# Patient Record
Sex: MALE | Race: WHITE | NOT HISPANIC OR LATINO | Employment: OTHER | URBAN - NONMETROPOLITAN AREA
[De-identification: names, ages, dates, MRNs, and addresses within clinical notes are randomized per-mention and may not be internally consistent; named-entity substitution may affect disease eponyms.]

---

## 2017-08-02 ENCOUNTER — OFFICE VISIT (OUTPATIENT)
Dept: CARDIOLOGY | Facility: CLINIC | Age: 72
End: 2017-08-02
Payer: MEDICARE

## 2017-08-02 VITALS
SYSTOLIC BLOOD PRESSURE: 110 MMHG | DIASTOLIC BLOOD PRESSURE: 70 MMHG | BODY MASS INDEX: 29.23 KG/M2 | HEART RATE: 72 BPM | OXYGEN SATURATION: 91 % | HEIGHT: 63 IN | WEIGHT: 165 LBS

## 2017-08-02 DIAGNOSIS — R73.9 HYPERGLYCEMIA: ICD-10-CM

## 2017-08-02 DIAGNOSIS — E78.5 OTHER AND UNSPECIFIED HYPERLIPIDEMIA: ICD-10-CM

## 2017-08-02 DIAGNOSIS — I10 ESSENTIAL HYPERTENSION, BENIGN: ICD-10-CM

## 2017-08-02 DIAGNOSIS — I35.0 NONRHEUMATIC AORTIC VALVE STENOSIS: ICD-10-CM

## 2017-08-02 PROCEDURE — 99203 OFFICE O/P NEW LOW 30 MIN: CPT | Performed by: INTERNAL MEDICINE

## 2017-08-02 PROCEDURE — 93000 ELECTROCARDIOGRAM COMPLETE: CPT | Performed by: INTERNAL MEDICINE

## 2017-08-02 RX ORDER — LISINOPRIL 2.5 MG/1
2.5 TABLET ORAL 2 TIMES DAILY
COMMUNITY

## 2017-08-02 RX ORDER — ATORVASTATIN CALCIUM 40 MG/1
40 TABLET, FILM COATED ORAL NIGHTLY
COMMUNITY

## 2017-08-02 ASSESSMENT — ENCOUNTER SYMPTOMS
VOMITING: 0
WEAKNESS: 0
SHORTNESS OF BREATH: 0
CONSTITUTIONAL NEGATIVE: 1
NEUROLOGICAL NEGATIVE: 1
BACK PAIN: 1
PSYCHIATRIC NEGATIVE: 1
NAUSEA: 0
PALPITATIONS: 0

## 2017-08-02 NOTE — Clinical Note
[facility] [address]  Phone: [phone]  Fax: [phone]              Nakul Dixon  1945    Encounter Date: 8/2/2017    Marty Sebastian M.D.          PROGRESS NOTE:  Subjective:   Nakul Dixon is a 71 y.o. male who is seen in consultation today at the request of MAYURI Cabral for evaluation of a heart murmur. The patient states he has had a heart murmur for many years, but no one in the past this patient much attention to it until now. He has no history of rheumatic fever. No history of exercise intolerance, chest pain or known coronary disease.  His cardiac risk factor profile is positive for hypertension and hyperlipidemia. He also has hyperglycemia. The patient is a nonsmoker.  He has a history of back problems with limited range of motion but remains as active as he can.    No past medical history on file.  No past surgical history on file.  No family history on file.  History   Smoking status   • Not on file   Smokeless tobacco   • Not on file     Allergies not on file  No outpatient encounter prescriptions on file as of 8/2/2017.     No facility-administered encounter medications on file as of 8/2/2017.     Review of Systems   Constitutional: Negative.  Negative for malaise/fatigue.   Respiratory: Negative for shortness of breath.    Cardiovascular: Negative for chest pain, palpitations and leg swelling.   Gastrointestinal: Negative for nausea and vomiting.   Musculoskeletal: Positive for back pain.   Neurological: Negative.  Negative for weakness.   Psychiatric/Behavioral: Negative.         Objective:   There were no vitals taken for this visit.    Physical Exam   Constitutional: He is oriented to person, place, and time. He appears well-developed and well-nourished. No distress.   HENT:   Head: Atraumatic.   Eyes: Conjunctivae and EOM are normal. Pupils are equal, round, and reactive to light.   Neck:  Neck supple. No JVD present.   Cardiovascular: Normal rate and regular rhythm.    Murmur heard.   Crescendo decrescendo systolic murmur is present with a grade of 2/6   No delay in carotid upstroke   Pulmonary/Chest: Effort normal and breath sounds normal. No respiratory distress. He has no wheezes. He has no rales.   Abdominal: Soft. There is no tenderness.   Musculoskeletal: He exhibits no edema.   Reduced range of motion of back   Neurological: He is alert and oriented to person, place, and time.   Skin: Skin is warm and dry. He is not diaphoretic.       Assessment:     1. Nonrheumatic aortic valve stenosis  [facility] EKG (Clinic Performed)    Echocardiogram Comp w/o Cont   2. Other and unspecified hyperlipidemia  [facility] EKG (Clinic Performed)   3. Essential hypertension, benign  [facility] EKG (Clinic Performed)   4. Hyperglycemia  [facility] EKG (Clinic Performed)       Medical Decision Making:  Today's Assessment / Status / Plan:     EKG: Sinus rhythm, early R-wave transition, early repolarization pattern.    Impression:  Aortic stenosis. The patient is a classic murmur of aortic valve disease. There is no delayed carotid upstroke. The murmur does not appear to represent severe stenosis by exam. An echo will be obtained to confirm this. The patient has significant risk factors for coronary disease including hypertension and hyperlipidemia. He decline my amputation for treadmill today. EKG is unremarkable except for early repolarization. Return in one year.    Cc: MAYURI Cabral Nv      No Recipients

## 2017-08-02 NOTE — MR AVS SNAPSHOT
Nakul Dixon   2017 3:20 PM   Office Visit   MRN: [id number]    Department:  Heart [facility]   Dept Phone:  [phone]    Description:  Male : 1945   Provider:  Marty Sebastian M.D.           Allergies as of 2017     Not on File      You were diagnosed with     Nonrheumatic aortic valve stenosis   [130958]       Other and unspecified hyperlipidemia   [272.4.ICD-9-CM]       Essential hypertension, benign   [401.1.ICD-9-CM]       Hyperglycemia   [525018]         Basic Information     Date Of Birth Sex Race Ethnicity Preferred Language    1945 Male Other Non- English      Problem List              ICD-10-CM Priority Class Noted - Resolved    Aortic stenosis I35.0   2017 - Present    Other and unspecified hyperlipidemia E78.5   2017 - Present    Essential hypertension, benign I10   2017 - Present    Hyperglycemia R73.9   2017 - Present      Health Maintenance        Date Due Completion Dates    IMM DTaP/Tdap/Td Vaccine (1 - Tdap) 1964 ---    COLONOSCOPY 1995 ---    IMM ZOSTER VACCINE 2005 ---    IMM PNEUMOCOCCAL 65+ (ADULT) LOW/MEDIUM RISK SERIES (1 of 2 - PCV13) 2010 ---    IMM INFLUENZA (1) 2017 ---            Current Immunizations     No immunizations on file.      Below and/or attached are the medications your provider expects you to take. Review all of your home medications and newly ordered medications with your provider and/or pharmacist. Follow medication instructions as directed by your provider and/or pharmacist. Please keep your medication list with you and share with your provider. Update the information when medications are discontinued, doses are changed, or new medications (including over-the-counter products) are added; and carry medication information at all times in the event of emergency situations     Allergies:  (Not on file)          Medications  Valid as of: 2017 -  4:18 PM    Generic Name Brand Name Tablet  Size Instructions for use    .                 Medicines prescribed today were sent to:     None      Medication refill instructions:       If your prescription bottle indicates you have medication refills left, it is not necessary to call your provider’s office. Please contact your pharmacy and they will refill your medication.    If your prescription bottle indicates you do not have any refills left, you may request refills at any time through one of the following ways: The online [email or web address] system (except Urgent Care), by calling your provider’s office, or by asking your pharmacy to contact your provider’s office with a refill request. Medication refills are processed only during regular business hours and may not be available until the next business day. Your provider may request additional information or to have a follow-up visit with you prior to refilling your medication.   *Please Note: Medication refills are assigned a new Rx number when refilled electronically. Your pharmacy may indicate that no refills were authorized even though a new prescription for the same medication is available at the pharmacy. Please request the medicine by name with the pharmacy before contacting your provider for a refill.        Your To Do List     Future Labs/Procedures Complete By Expires    Echocardiogram Comp w/o Cont  As directed 8/2/2018         [email or web address] Access Code: [email or web address]  Expires: 9/1/2017  4:18 PM    Your email address is not on file at [facility].  Email Addresses are required for you to sign up for [email or web address]), please contact [phone] to verify your personal information and to provide your email address prior to attempting to register for [email or web address]).    Nakul Dixon  [address]   [place] [id number]    [employer])  A secure, online tool to manage your health information     [facility]’s [employer])® is a secure, online tool that connects you to your personalized health information from the privacy of your home --  day or night - making it very easy for you to manage your healthcare. Once the activation process is completed, you can even access your medical information using the [employer] arabella, which is available for free in the Apple Arabella store or Google Play store.     To learn more about [employer], visit [email or web address]    There are two levels of access available (as shown below):   My Chart Features  [facility]  Urgent  Care Non-[facility]   Email your healthcare team securely and privately 24/7 X X X    Manage appointments: schedule your next appointment; view details of past/upcoming appointments X      Request prescription refills. X      View recent personal medical records, including lab and immunizations X X X X   View health record, including health history, allergies, medications X X X X   Read reports about your outpatient visits, procedures, consult and ER notes X X X X   See your discharge summary, which is a recap of your hospital and/or ER visit that includes your diagnosis, lab results, and care plan X X  X     How to register for [employer]:  Once your e-mail address has been verified, follow the following steps to sign up for [employer].     1. Go to  [email or web address]  2. Click on the Sign Up Now box, which takes you to the New Member Sign Up page. You will need to provide the following information:  a. Enter your Ch[employer]r Access Code exactly as it appears at the top of this page. (You will not need to use this code after you’ve completed the sign-up process. If you do not sign up before the expiration date, you must request a new code.)   b. Enter your date of birth.   c. Enter your home email address.   d. Click Submit, and follow the next screen’s instructions.  3. Create a [employer] ID. This will be your Ch[employer]r login ID and cannot be changed, so think of one that is secure and easy to remember.  4. Create a [employer] password. You can change  your password at any time.  5. Enter your Password Reset Question and Answer. This can be used at a later time if you forget your password.   6. Enter your e-mail address. This allows you to receive e-mail notifications when new information is available in [employer].  7. Click Sign Up. You can now view your health information.    For assistance activating your [employer] account, call [phone]

## 2017-08-02 NOTE — PROGRESS NOTES
Subjective:   Nakul Dixon is a 71 y.o. male who is seen in consultation today at the request of MAYURI Cabral for evaluation of a heart murmur. The patient states he has had a heart murmur for many years, but no one in the past this patient much attention to it until now. He has no history of rheumatic fever. No history of exercise intolerance, chest pain or known coronary disease.  His cardiac risk factor profile is positive for hypertension and hyperlipidemia. He also has hyperglycemia. The patient is a nonsmoker.  He has a history of back problems with limited range of motion but remains as active as he can.    No past medical history on file.  No past surgical history on file.  No family history on file.  History   Smoking status   • Not on file   Smokeless tobacco   • Not on file     Allergies not on file  No outpatient encounter prescriptions on file as of 8/2/2017.     No facility-administered encounter medications on file as of 8/2/2017.     Review of Systems   Constitutional: Negative.  Negative for malaise/fatigue.   Respiratory: Negative for shortness of breath.    Cardiovascular: Negative for chest pain, palpitations and leg swelling.   Gastrointestinal: Negative for nausea and vomiting.   Musculoskeletal: Positive for back pain.   Neurological: Negative.  Negative for weakness.   Psychiatric/Behavioral: Negative.         Objective:   There were no vitals taken for this visit.    Physical Exam   Constitutional: He is oriented to person, place, and time. He appears well-developed and well-nourished. No distress.   HENT:   Head: Atraumatic.   Eyes: Conjunctivae and EOM are normal. Pupils are equal, round, and reactive to light.   Neck: Neck supple. No JVD present.   Cardiovascular: Normal rate and regular rhythm.    Murmur heard.   Crescendo decrescendo systolic murmur is present with a grade of 2/6   No delay in carotid upstroke   Pulmonary/Chest: Effort normal and breath sounds normal. No  respiratory distress. He has no wheezes. He has no rales.   Abdominal: Soft. There is no tenderness.   Musculoskeletal: He exhibits no edema.   Reduced range of motion of back   Neurological: He is alert and oriented to person, place, and time.   Skin: Skin is warm and dry. He is not diaphoretic.       Assessment:     1. Nonrheumatic aortic valve stenosis  [facility] EKG (Clinic Performed)    Echocardiogram Comp w/o Cont   2. Other and unspecified hyperlipidemia  [facility] EKG (Clinic Performed)   3. Essential hypertension, benign  [facility] EKG (Clinic Performed)   4. Hyperglycemia  [facility] EKG (Clinic Performed)       Medical Decision Making:  Today's Assessment / Status / Plan:     EKG: Sinus rhythm, early R-wave transition, early repolarization pattern.    Impression:  Aortic stenosis. The patient is a classic murmur of aortic valve disease. There is no delayed carotid upstroke. The murmur does not appear to represent severe stenosis by exam. An echo will be obtained to confirm this. The patient has significant risk factors for coronary disease including hypertension and hyperlipidemia. He decline my amputation for treadmill today. EKG is unremarkable except for early repolarization. Return in one year.    Cc: MAYURI Cabral Nv

## 2017-08-03 ENCOUNTER — TELEPHONE (OUTPATIENT)
Dept: CARDIOLOGY | Facility: MEDICAL CENTER | Age: 72
End: 2017-08-03

## 2017-08-03 NOTE — TELEPHONE ENCOUNTER
[facility] doesn't do echos.,   [facility]  alerted to call pt. To schedule in [place].

## 2017-08-03 NOTE — TELEPHONE ENCOUNTER
----- Message from Virginia Wilcox sent at 8/3/2017  4:18 PM PDT -----  Regarding: orders sent to [facility]  Damien Maxwell at [facility] called about order for cardiac stress test. He said they do not do cardiac stress tests there. Patient needs to be scheduled at another facility for the test. He can be reached at [phone], ext. [phone].

## 2017-09-06 ENCOUNTER — NON-PROVIDER VISIT (OUTPATIENT)
Dept: CARDIOLOGY | Facility: PHYSICIAN GROUP | Age: 72
End: 2017-09-06
Payer: MEDICARE

## 2017-09-06 DIAGNOSIS — I35.0 NONRHEUMATIC AORTIC VALVE STENOSIS: ICD-10-CM

## 2017-09-07 LAB
LV EJECT FRACT  99904: 65
LV EJECT FRACT MOD 2C 99903: 54.47
LV EJECT FRACT MOD 4C 99902: 58.13
LV EJECT FRACT MOD BP 99901: 57.24

## 2017-09-15 ENCOUNTER — TELEPHONE (OUTPATIENT)
Dept: CARDIOLOGY | Facility: MEDICAL CENTER | Age: 72
End: 2017-09-15

## 2017-09-29 NOTE — TELEPHONE ENCOUNTER
Called pt. To advise.      Message   Received: Today   Message Contents   Marty Sebasitan M.D.  Megan Luna, L.PCATHY   Caller: Unspecified (2 weeks ago)             Echo shows only very mild narrowing. Nothing needs to be done, just observation.   Repeat echo in 2 yers

## 2017-12-15 ENCOUNTER — HOSPITAL ENCOUNTER (INPATIENT)
Facility: MEDICAL CENTER | Age: 72
LOS: 1 days | DRG: 054 | End: 2017-12-16
Attending: EMERGENCY MEDICINE | Admitting: HOSPITALIST
Payer: MEDICARE

## 2017-12-15 ENCOUNTER — RESOLUTE PROFESSIONAL BILLING HOSPITAL PROF FEE (OUTPATIENT)
Dept: HOSPITALIST | Facility: MEDICAL CENTER | Age: 72
End: 2017-12-15
Payer: MEDICARE

## 2017-12-15 ENCOUNTER — HOSPITAL ENCOUNTER (OUTPATIENT)
Dept: RADIOLOGY | Facility: MEDICAL CENTER | Age: 72
End: 2017-12-15

## 2017-12-15 DIAGNOSIS — R90.0 INTRACRANIAL MASS: ICD-10-CM

## 2017-12-15 PROBLEM — I61.9 HEMORRHAGIC STROKE (HCC): Status: ACTIVE | Noted: 2017-12-15

## 2017-12-15 PROBLEM — C71.9 CANCER OF BRAIN (HCC): Status: ACTIVE | Noted: 2017-12-15

## 2017-12-15 PROBLEM — C22.9 CANCER OF LIVER (HCC): Status: ACTIVE | Noted: 2017-12-15

## 2017-12-15 PROBLEM — E78.5 DYSLIPIDEMIA: Status: ACTIVE | Noted: 2017-12-15

## 2017-12-15 PROBLEM — C34.90 CANCER OF LUNG (HCC): Status: ACTIVE | Noted: 2017-12-15

## 2017-12-15 LAB
ALBUMIN SERPL BCP-MCNC: 4.1 G/DL (ref 3.2–4.9)
ALBUMIN/GLOB SERPL: 1.4 G/DL
ALP SERPL-CCNC: 76 U/L (ref 30–99)
ALT SERPL-CCNC: 20 U/L (ref 2–50)
ANION GAP SERPL CALC-SCNC: 8 MMOL/L (ref 0–11.9)
APPEARANCE UR: CLEAR
APTT PPP: 27.1 SEC (ref 24.7–36)
AST SERPL-CCNC: 22 U/L (ref 12–45)
BACTERIA #/AREA URNS HPF: NEGATIVE /HPF
BASOPHILS # BLD AUTO: 0.7 % (ref 0–1.8)
BASOPHILS # BLD: 0.04 K/UL (ref 0–0.12)
BILIRUB SERPL-MCNC: 0.4 MG/DL (ref 0.1–1.5)
BILIRUB UR QL STRIP.AUTO: NEGATIVE
BUN SERPL-MCNC: 18 MG/DL (ref 8–22)
CALCIUM SERPL-MCNC: 9.6 MG/DL (ref 8.5–10.5)
CHLORIDE SERPL-SCNC: 104 MMOL/L (ref 96–112)
CO2 SERPL-SCNC: 25 MMOL/L (ref 20–33)
COLOR UR: YELLOW
CREAT SERPL-MCNC: 0.84 MG/DL (ref 0.5–1.4)
EOSINOPHIL # BLD AUTO: 0.18 K/UL (ref 0–0.51)
EOSINOPHIL NFR BLD: 3.1 % (ref 0–6.9)
EPI CELLS #/AREA URNS HPF: ABNORMAL /HPF
ERYTHROCYTE [DISTWIDTH] IN BLOOD BY AUTOMATED COUNT: 41.1 FL (ref 35.9–50)
GFR SERPL CREATININE-BSD FRML MDRD: >60 ML/MIN/1.73 M 2
GLOBULIN SER CALC-MCNC: 3 G/DL (ref 1.9–3.5)
GLUCOSE SERPL-MCNC: 114 MG/DL (ref 65–99)
GLUCOSE UR STRIP.AUTO-MCNC: NEGATIVE MG/DL
HCT VFR BLD AUTO: 43.4 % (ref 42–52)
HGB BLD-MCNC: 14.6 G/DL (ref 14–18)
HYALINE CASTS #/AREA URNS LPF: ABNORMAL /LPF
IMM GRANULOCYTES # BLD AUTO: 0.02 K/UL (ref 0–0.11)
IMM GRANULOCYTES NFR BLD AUTO: 0.3 % (ref 0–0.9)
INR PPP: 0.96 (ref 0.87–1.13)
KETONES UR STRIP.AUTO-MCNC: NEGATIVE MG/DL
LEUKOCYTE ESTERASE UR QL STRIP.AUTO: NEGATIVE
LYMPHOCYTES # BLD AUTO: 1.56 K/UL (ref 1–4.8)
LYMPHOCYTES NFR BLD: 26.7 % (ref 22–41)
MCH RBC QN AUTO: 29.3 PG (ref 27–33)
MCHC RBC AUTO-ENTMCNC: 33.6 G/DL (ref 33.7–35.3)
MCV RBC AUTO: 87.1 FL (ref 81.4–97.8)
MICRO URNS: ABNORMAL
MONOCYTES # BLD AUTO: 0.49 K/UL (ref 0–0.85)
MONOCYTES NFR BLD AUTO: 8.4 % (ref 0–13.4)
NEUTROPHILS # BLD AUTO: 3.56 K/UL (ref 1.82–7.42)
NEUTROPHILS NFR BLD: 60.8 % (ref 44–72)
NITRITE UR QL STRIP.AUTO: NEGATIVE
NRBC # BLD AUTO: 0 K/UL
NRBC BLD AUTO-RTO: 0 /100 WBC
PH UR STRIP.AUTO: 5 [PH]
PLATELET # BLD AUTO: 227 K/UL (ref 164–446)
PMV BLD AUTO: 9.4 FL (ref 9–12.9)
POTASSIUM SERPL-SCNC: 4.1 MMOL/L (ref 3.6–5.5)
PROT SERPL-MCNC: 7.1 G/DL (ref 6–8.2)
PROT UR QL STRIP: 30 MG/DL
PROTHROMBIN TIME: 12.5 SEC (ref 12–14.6)
RBC # BLD AUTO: 4.98 M/UL (ref 4.7–6.1)
RBC # URNS HPF: ABNORMAL /HPF
RBC UR QL AUTO: NEGATIVE
SODIUM SERPL-SCNC: 137 MMOL/L (ref 135–145)
SP GR UR STRIP.AUTO: 1.03
TROPONIN I SERPL-MCNC: <0.01 NG/ML (ref 0–0.04)
UROBILINOGEN UR STRIP.AUTO-MCNC: 0.2 MG/DL
WBC # BLD AUTO: 5.9 K/UL (ref 4.8–10.8)
WBC #/AREA URNS HPF: ABNORMAL /HPF

## 2017-12-15 PROCEDURE — 85730 THROMBOPLASTIN TIME PARTIAL: CPT

## 2017-12-15 PROCEDURE — 81001 URINALYSIS AUTO W/SCOPE: CPT

## 2017-12-15 PROCEDURE — 93010 ELECTROCARDIOGRAM REPORT: CPT | Performed by: INTERNAL MEDICINE

## 2017-12-15 PROCEDURE — 85610 PROTHROMBIN TIME: CPT

## 2017-12-15 PROCEDURE — 85025 COMPLETE CBC W/AUTO DIFF WBC: CPT

## 2017-12-15 PROCEDURE — 80053 COMPREHEN METABOLIC PANEL: CPT

## 2017-12-15 PROCEDURE — 700111 HCHG RX REV CODE 636 W/ 250 OVERRIDE (IP): Performed by: EMERGENCY MEDICINE

## 2017-12-15 PROCEDURE — 99285 EMERGENCY DEPT VISIT HI MDM: CPT

## 2017-12-15 PROCEDURE — 96374 THER/PROPH/DIAG INJ IV PUSH: CPT

## 2017-12-15 PROCEDURE — 93005 ELECTROCARDIOGRAM TRACING: CPT | Performed by: HOSPITALIST

## 2017-12-15 PROCEDURE — 770006 HCHG ROOM/CARE - MED/SURG/GYN SEMI*

## 2017-12-15 PROCEDURE — 36415 COLL VENOUS BLD VENIPUNCTURE: CPT

## 2017-12-15 PROCEDURE — 99223 1ST HOSP IP/OBS HIGH 75: CPT | Performed by: HOSPITALIST

## 2017-12-15 PROCEDURE — 84484 ASSAY OF TROPONIN QUANT: CPT

## 2017-12-15 RX ORDER — LORAZEPAM 1 MG/1
1 TABLET ORAL EVERY 4 HOURS PRN
Status: DISCONTINUED | OUTPATIENT
Start: 2017-12-15 | End: 2017-12-16 | Stop reason: HOSPADM

## 2017-12-15 RX ORDER — LABETALOL HYDROCHLORIDE 5 MG/ML
10 INJECTION, SOLUTION INTRAVENOUS
Status: DISCONTINUED | OUTPATIENT
Start: 2017-12-15 | End: 2017-12-16 | Stop reason: HOSPADM

## 2017-12-15 RX ORDER — HYDRALAZINE HYDROCHLORIDE 20 MG/ML
20 INJECTION INTRAMUSCULAR; INTRAVENOUS EVERY 4 HOURS PRN
Status: DISCONTINUED | OUTPATIENT
Start: 2017-12-15 | End: 2017-12-16 | Stop reason: HOSPADM

## 2017-12-15 RX ORDER — AMOXICILLIN 250 MG
2 CAPSULE ORAL 2 TIMES DAILY
Status: DISCONTINUED | OUTPATIENT
Start: 2017-12-16 | End: 2017-12-16 | Stop reason: HOSPADM

## 2017-12-15 RX ORDER — LORAZEPAM 1 MG/1
4 TABLET ORAL
Status: DISCONTINUED | OUTPATIENT
Start: 2017-12-15 | End: 2017-12-16 | Stop reason: HOSPADM

## 2017-12-15 RX ORDER — BISACODYL 10 MG
10 SUPPOSITORY, RECTAL RECTAL
Status: DISCONTINUED | OUTPATIENT
Start: 2017-12-15 | End: 2017-12-16 | Stop reason: HOSPADM

## 2017-12-15 RX ORDER — LORAZEPAM 1 MG/1
3 TABLET ORAL
Status: DISCONTINUED | OUTPATIENT
Start: 2017-12-15 | End: 2017-12-16 | Stop reason: HOSPADM

## 2017-12-15 RX ORDER — DEXAMETHASONE SODIUM PHOSPHATE 4 MG/ML
4 INJECTION, SOLUTION INTRA-ARTICULAR; INTRALESIONAL; INTRAMUSCULAR; INTRAVENOUS; SOFT TISSUE EVERY 6 HOURS
Status: DISCONTINUED | OUTPATIENT
Start: 2017-12-16 | End: 2017-12-16 | Stop reason: HOSPADM

## 2017-12-15 RX ORDER — SODIUM CHLORIDE 9 MG/ML
INJECTION, SOLUTION INTRAVENOUS
Status: DISCONTINUED | OUTPATIENT
Start: 2017-12-15 | End: 2017-12-16 | Stop reason: HOSPADM

## 2017-12-15 RX ORDER — POLYETHYLENE GLYCOL 3350 17 G/17G
1 POWDER, FOR SOLUTION ORAL
Status: DISCONTINUED | OUTPATIENT
Start: 2017-12-15 | End: 2017-12-16 | Stop reason: HOSPADM

## 2017-12-15 RX ORDER — LORAZEPAM 2 MG/ML
0.5 INJECTION INTRAMUSCULAR EVERY 4 HOURS PRN
Status: DISCONTINUED | OUTPATIENT
Start: 2017-12-15 | End: 2017-12-16 | Stop reason: HOSPADM

## 2017-12-15 RX ORDER — LORAZEPAM 1 MG/1
0.5 TABLET ORAL EVERY 4 HOURS PRN
Status: DISCONTINUED | OUTPATIENT
Start: 2017-12-15 | End: 2017-12-16 | Stop reason: HOSPADM

## 2017-12-15 RX ORDER — LORAZEPAM 2 MG/ML
2 INJECTION INTRAMUSCULAR
Status: DISCONTINUED | OUTPATIENT
Start: 2017-12-15 | End: 2017-12-16 | Stop reason: HOSPADM

## 2017-12-15 RX ORDER — LORAZEPAM 2 MG/ML
1 INJECTION INTRAMUSCULAR
Status: DISCONTINUED | OUTPATIENT
Start: 2017-12-15 | End: 2017-12-16 | Stop reason: HOSPADM

## 2017-12-15 RX ORDER — LORAZEPAM 1 MG/1
2 TABLET ORAL
Status: DISCONTINUED | OUTPATIENT
Start: 2017-12-15 | End: 2017-12-16 | Stop reason: HOSPADM

## 2017-12-15 RX ORDER — LORAZEPAM 2 MG/ML
1.5 INJECTION INTRAMUSCULAR
Status: DISCONTINUED | OUTPATIENT
Start: 2017-12-15 | End: 2017-12-16 | Stop reason: HOSPADM

## 2017-12-15 RX ORDER — DEXAMETHASONE SODIUM PHOSPHATE 4 MG/ML
4 INJECTION, SOLUTION INTRA-ARTICULAR; INTRALESIONAL; INTRAMUSCULAR; INTRAVENOUS; SOFT TISSUE ONCE
Status: COMPLETED | OUTPATIENT
Start: 2017-12-15 | End: 2017-12-15

## 2017-12-15 RX ADMIN — DEXAMETHASONE SODIUM PHOSPHATE 4 MG: 4 INJECTION, SOLUTION INTRAMUSCULAR; INTRAVENOUS at 21:20

## 2017-12-15 ASSESSMENT — ENCOUNTER SYMPTOMS
MYALGIAS: 0
WHEEZING: 0
SHORTNESS OF BREATH: 0
VOMITING: 0
WEAKNESS: 1
PHOTOPHOBIA: 0
LOSS OF CONSCIOUSNESS: 0
DIARRHEA: 0
FOCAL WEAKNESS: 1
FEVER: 0
NECK PAIN: 0
SENSORY CHANGE: 1
BRUISES/BLEEDS EASILY: 0
TREMORS: 0
COUGH: 0
CHILLS: 0
SPEECH CHANGE: 1
PALPITATIONS: 0
NAUSEA: 0
BACK PAIN: 0
TINGLING: 0
BLURRED VISION: 0
FLANK PAIN: 0
HEARTBURN: 0
DOUBLE VISION: 0
SEIZURES: 0
HEADACHES: 0
ABDOMINAL PAIN: 0
DEPRESSION: 0
CONSTIPATION: 0

## 2017-12-15 ASSESSMENT — PATIENT HEALTH QUESTIONNAIRE - PHQ9
1. LITTLE INTEREST OR PLEASURE IN DOING THINGS: NOT AT ALL
SUM OF ALL RESPONSES TO PHQ QUESTIONS 1-9: 0
SUM OF ALL RESPONSES TO PHQ9 QUESTIONS 1 AND 2: 0
2. FEELING DOWN, DEPRESSED, IRRITABLE, OR HOPELESS: NOT AT ALL

## 2017-12-15 ASSESSMENT — COGNITIVE AND FUNCTIONAL STATUS - GENERAL
DRESSING REGULAR UPPER BODY CLOTHING: A LITTLE
DAILY ACTIVITIY SCORE: 18
MOVING FROM LYING ON BACK TO SITTING ON SIDE OF FLAT BED: A LITTLE
EATING MEALS: A LITTLE
MOVING TO AND FROM BED TO CHAIR: A LITTLE
SUGGESTED CMS G CODE MODIFIER MOBILITY: CK
PERSONAL GROOMING: A LITTLE
WALKING IN HOSPITAL ROOM: A LITTLE
SUGGESTED CMS G CODE MODIFIER DAILY ACTIVITY: CK
DRESSING REGULAR LOWER BODY CLOTHING: A LITTLE
STANDING UP FROM CHAIR USING ARMS: A LITTLE
CLIMB 3 TO 5 STEPS WITH RAILING: A LITTLE
TOILETING: A LITTLE
MOBILITY SCORE: 18
TURNING FROM BACK TO SIDE WHILE IN FLAT BAD: A LITTLE
HELP NEEDED FOR BATHING: A LITTLE

## 2017-12-15 ASSESSMENT — PAIN SCALES - GENERAL
PAINLEVEL_OUTOF10: 0

## 2017-12-15 ASSESSMENT — LIFESTYLE VARIABLES
HOW MANY TIMES IN THE PAST YEAR HAVE YOU HAD 5 OR MORE DRINKS IN A DAY: 0
TOTAL SCORE: 0
EVER_SMOKED: YES
HAVE PEOPLE ANNOYED YOU BY CRITICIZING YOUR DRINKING: NO
HAVE YOU EVER FELT YOU SHOULD CUT DOWN ON YOUR DRINKING: NO
ON A TYPICAL DAY WHEN YOU DRINK ALCOHOL HOW MANY DRINKS DO YOU HAVE: 2
CONSUMPTION TOTAL: NEGATIVE
SUBSTANCE_ABUSE: 0
EVER FELT BAD OR GUILTY ABOUT YOUR DRINKING: NO
DO YOU DRINK ALCOHOL: YES
TOTAL SCORE: 0
AVERAGE NUMBER OF DAYS PER WEEK YOU HAVE A DRINK CONTAINING ALCOHOL: 6
EVER HAD A DRINK FIRST THING IN THE MORNING TO STEADY YOUR NERVES TO GET RID OF A HANGOVER: NO
TOTAL SCORE: 0
ALCOHOL_USE: YES

## 2017-12-16 ENCOUNTER — PATIENT OUTREACH (OUTPATIENT)
Dept: HEALTH INFORMATION MANAGEMENT | Facility: OTHER | Age: 72
End: 2017-12-16

## 2017-12-16 ENCOUNTER — HOSPITAL ENCOUNTER (OUTPATIENT)
Dept: RADIOLOGY | Facility: MEDICAL CENTER | Age: 72
End: 2017-12-16

## 2017-12-16 VITALS
DIASTOLIC BLOOD PRESSURE: 77 MMHG | OXYGEN SATURATION: 94 % | RESPIRATION RATE: 18 BRPM | SYSTOLIC BLOOD PRESSURE: 145 MMHG | BODY MASS INDEX: 28.79 KG/M2 | WEIGHT: 162.48 LBS | TEMPERATURE: 97.6 F | HEART RATE: 94 BPM | HEIGHT: 63 IN

## 2017-12-16 LAB
ALBUMIN SERPL BCP-MCNC: 3.7 G/DL (ref 3.2–4.9)
ALBUMIN/GLOB SERPL: 1.3 G/DL
ALP SERPL-CCNC: 68 U/L (ref 30–99)
ALT SERPL-CCNC: 18 U/L (ref 2–50)
ANION GAP SERPL CALC-SCNC: 6 MMOL/L (ref 0–11.9)
AST SERPL-CCNC: 19 U/L (ref 12–45)
BASOPHILS # BLD AUTO: 0.2 % (ref 0–1.8)
BASOPHILS # BLD: 0.01 K/UL (ref 0–0.12)
BILIRUB SERPL-MCNC: 0.4 MG/DL (ref 0.1–1.5)
BUN SERPL-MCNC: 18 MG/DL (ref 8–22)
CALCIUM SERPL-MCNC: 9.4 MG/DL (ref 8.5–10.5)
CHLORIDE SERPL-SCNC: 104 MMOL/L (ref 96–112)
CO2 SERPL-SCNC: 26 MMOL/L (ref 20–33)
CREAT SERPL-MCNC: 0.78 MG/DL (ref 0.5–1.4)
EKG IMPRESSION: NORMAL
EKG IMPRESSION: NORMAL
EOSINOPHIL # BLD AUTO: 0 K/UL (ref 0–0.51)
EOSINOPHIL NFR BLD: 0 % (ref 0–6.9)
ERYTHROCYTE [DISTWIDTH] IN BLOOD BY AUTOMATED COUNT: 41.3 FL (ref 35.9–50)
GFR SERPL CREATININE-BSD FRML MDRD: >60 ML/MIN/1.73 M 2
GLOBULIN SER CALC-MCNC: 2.9 G/DL (ref 1.9–3.5)
GLUCOSE BLD-MCNC: 192 MG/DL (ref 65–99)
GLUCOSE BLD-MCNC: 266 MG/DL (ref 65–99)
GLUCOSE SERPL-MCNC: 216 MG/DL (ref 65–99)
HCT VFR BLD AUTO: 41.5 % (ref 42–52)
HGB BLD-MCNC: 14.1 G/DL (ref 14–18)
IMM GRANULOCYTES # BLD AUTO: 0.02 K/UL (ref 0–0.11)
IMM GRANULOCYTES NFR BLD AUTO: 0.3 % (ref 0–0.9)
LYMPHOCYTES # BLD AUTO: 0.46 K/UL (ref 1–4.8)
LYMPHOCYTES NFR BLD: 7.6 % (ref 22–41)
MCH RBC QN AUTO: 29.8 PG (ref 27–33)
MCHC RBC AUTO-ENTMCNC: 34 G/DL (ref 33.7–35.3)
MCV RBC AUTO: 87.7 FL (ref 81.4–97.8)
MONOCYTES # BLD AUTO: 0.07 K/UL (ref 0–0.85)
MONOCYTES NFR BLD AUTO: 1.2 % (ref 0–13.4)
NEUTROPHILS # BLD AUTO: 5.46 K/UL (ref 1.82–7.42)
NEUTROPHILS NFR BLD: 90.7 % (ref 44–72)
NRBC # BLD AUTO: 0 K/UL
NRBC BLD AUTO-RTO: 0 /100 WBC
PLATELET # BLD AUTO: 205 K/UL (ref 164–446)
PMV BLD AUTO: 9.7 FL (ref 9–12.9)
POTASSIUM SERPL-SCNC: 4.3 MMOL/L (ref 3.6–5.5)
PROT SERPL-MCNC: 6.6 G/DL (ref 6–8.2)
RBC # BLD AUTO: 4.73 M/UL (ref 4.7–6.1)
SODIUM SERPL-SCNC: 136 MMOL/L (ref 135–145)
WBC # BLD AUTO: 6 K/UL (ref 4.8–10.8)

## 2017-12-16 PROCEDURE — 93010 ELECTROCARDIOGRAM REPORT: CPT | Performed by: INTERNAL MEDICINE

## 2017-12-16 PROCEDURE — 36415 COLL VENOUS BLD VENIPUNCTURE: CPT

## 2017-12-16 PROCEDURE — 3E0234Z INTRODUCTION OF SERUM, TOXOID AND VACCINE INTO MUSCLE, PERCUTANEOUS APPROACH: ICD-10-PCS | Performed by: HOSPITALIST

## 2017-12-16 PROCEDURE — 80053 COMPREHEN METABOLIC PANEL: CPT

## 2017-12-16 PROCEDURE — 99239 HOSP IP/OBS DSCHRG MGMT >30: CPT | Performed by: HOSPITALIST

## 2017-12-16 PROCEDURE — 85025 COMPLETE CBC W/AUTO DIFF WBC: CPT

## 2017-12-16 PROCEDURE — 90471 IMMUNIZATION ADMIN: CPT

## 2017-12-16 PROCEDURE — 700111 HCHG RX REV CODE 636 W/ 250 OVERRIDE (IP): Performed by: HOSPITALIST

## 2017-12-16 PROCEDURE — 82962 GLUCOSE BLOOD TEST: CPT

## 2017-12-16 PROCEDURE — 90662 IIV NO PRSV INCREASED AG IM: CPT | Performed by: HOSPITALIST

## 2017-12-16 PROCEDURE — 93005 ELECTROCARDIOGRAM TRACING: CPT | Performed by: HOSPITALIST

## 2017-12-16 PROCEDURE — 90670 PCV13 VACCINE IM: CPT | Performed by: HOSPITALIST

## 2017-12-16 RX ORDER — DEXAMETHASONE 4 MG/1
4 TABLET ORAL 3 TIMES DAILY
Qty: 120 TAB | Refills: 0 | Status: SHIPPED | OUTPATIENT
Start: 2017-12-16

## 2017-12-16 RX ADMIN — DEXAMETHASONE SODIUM PHOSPHATE 4 MG: 4 INJECTION, SOLUTION INTRAMUSCULAR; INTRAVENOUS at 12:16

## 2017-12-16 RX ADMIN — PNEUMOCOCCAL 13-VALENT CONJUGATE VACCINE 0.5 ML: 2.2; 2.2; 2.2; 2.2; 2.2; 4.4; 2.2; 2.2; 2.2; 2.2; 2.2; 2.2; 2.2 INJECTION, SUSPENSION INTRAMUSCULAR at 12:27

## 2017-12-16 RX ADMIN — INFLUENZA A VIRUSA/MICHIGAN/45/2015 X-275 (H1N1) ANTIGEN (FORMALDEHYDE INACTIVATED), INFLUENZA A VIRUS A/HONG KONG/4801/2014 X-263B (H3N2) ANTIGEN (FORMALDEHYDE INACTIVATED), AND INFLUENZA B VIRUS B/BRISBANE/60/2008 ANTIGEN (FORMALDEHYDE INACTIVATED) 0.5 ML: 60; 60; 60 INJECTION, SUSPENSION INTRAMUSCULAR at 12:28

## 2017-12-16 RX ADMIN — DEXAMETHASONE SODIUM PHOSPHATE 4 MG: 4 INJECTION, SOLUTION INTRAMUSCULAR; INTRAVENOUS at 00:47

## 2017-12-16 RX ADMIN — DEXAMETHASONE SODIUM PHOSPHATE 4 MG: 4 INJECTION, SOLUTION INTRAMUSCULAR; INTRAVENOUS at 05:23

## 2017-12-16 ASSESSMENT — PAIN SCALES - GENERAL
PAINLEVEL_OUTOF10: 0

## 2017-12-16 NOTE — ED NOTES
".  Chief Complaint   Patient presents with   • Unilateral Weakness     To left upper and lower extremities since Sunday, hx of brain tumors with mets     ./69   Pulse 64   Temp 36.9 °C (98.4 °F)   Resp 16   Ht 1.6 m (5' 3\")   Wt 74.8 kg (165 lb)   SpO2 97%   BMI 29.23 kg/m²     Sent by PCP with above complaints, MRI done yesterday showing \"multiple hemorrhagic masses\", educated on triage process, placed in lobby with significant other, told to inform staff of any changes in condition.    "

## 2017-12-16 NOTE — ASSESSMENT & PLAN NOTE
Left-sided weakness with concern for hemorrhagic stoke on MRI  We'll need blood sugar control  Blood pressure control IV PRN  Neurosurgery consult. We'll see the patient in the morning  PT OT speech eval is pending

## 2017-12-16 NOTE — PROGRESS NOTES
Pt A&Ox4, denies N/T, N/V. Noted weakness on left side, LUE flaccid. Pt is able to walk with hand held assist. Completed Med rec. Partially completed admit - vaccine administration needed. Passed bedside swallow eval. Tele in place. Family at bedside. Provided stroke education handbook, reviewed POC. Call light and personal belongings within reach

## 2017-12-16 NOTE — PROGRESS NOTES
Monitor summary: New @2231 SB-SR 53-78, OK 0.18, QRS 0.10, QT 0.36, per strip from monitor room.

## 2017-12-16 NOTE — CARE PLAN
Problem: Safety  Goal: Will remain free from injury    Intervention: Provide assistance with mobility  Assessed pt mobility, pt is able to ambulate with one person hand held assist      Problem: Knowledge Deficit  Goal: Knowledge of disease process/condition, treatment plan, diagnostic tests, and medications will improve    Intervention: Assess knowledge level of disease process/condition, treatment plan, diagnostic tests, and medications  Provided pt and family stroke education, provided stroke book, reviewed POC      Problem: Safety:  Goal: Will remain free from injury    Intervention: Provide assistance with mobility  Assessed pt mobility, pt is able to ambulate with one person hand held assist      Problem: Knowledge Deficit:  Goal: Knowledge of disease process/condition, treatment plan, diagnostic tests, and medications will improve    Intervention: Assess knowledge level of disease process/condition, treatment plan, diagnostic tests, and medications  Provided pt and family stroke education, provided stroke book, reviewed POC

## 2017-12-16 NOTE — THERAPY
Clinical swallow evaluation orders received and addressed. RN reported that the pt is discharging home at this time, and a CSE is not warranted.

## 2017-12-16 NOTE — ED NOTES
Rounded on pt. Pt ambulatory in the hallway with assist of handrail. Updated on wait times. VSS. Family continues to wait with pt in [facility].

## 2017-12-16 NOTE — ED PROVIDER NOTES
ED Provider Note    CHIEF COMPLAINT  Chief Complaint   Patient presents with   • Unilateral Weakness     To left upper and lower extremities since Sunday, hx of brain tumors with mets       HPI  Nakul Dixon is a 72 y.o. male who presentsTo the emergency department with difficulty with utilization of his left upper extremity. The patient states the symptoms started on Sunday when he did not feel right. He noticed that he is off balance and then had progressive weakness of his left upper and lower extremity. On Tuesday the patient went to the clinic and had an outpatient CT scan performed the following day that showed lesions that were concerning from a metastatic standpoint on the CT scan of his brain. The patient had a scheduled MRI with and without contrast today and this showed a cerebellar and cerebral suspected metastatic lesion with edema as well as mild acute hemorrhage. The patient was instructed to come to [facility] for further workup. The patient was unaware of any prior cancer. He does have some dyslipidemia and hypertension but otherwise has been healthy. He does not have any headaches. He does not have any visual changes.    REVIEW OF SYSTEMS  See HPI for further details. All other systems are negative.     PAST MEDICAL HISTORY  Past Medical History:   Diagnosis Date   • Cancer (CMS-HCC)     brain, lung, liver   • Hypercholesterolemia    • Hypertension        SOCIAL HISTORY  Social History     Social History   • Marital status:      Spouse name: N/A   • Number of children: N/A   • Years of education: N/A     Social History Main Topics   • Smoking status: Former Smoker     Packs/day: 1.00     Types: Cigarettes     Quit date: 1/1/1990   • Smokeless tobacco: Never Used   • Alcohol use Yes      Comment: 2 glasses wine/night   • Drug use: No   • Sexual activity: Not on file     Other Topics Concern   • Not on file     Social History Narrative   • No narrative on file           PHYSICAL EXAM  VITAL  "SIGNS: /81   Pulse (!) 58   Temp 36.9 °C (98.4 °F)   Resp 16   Ht 1.6 m (5' 3\")   Wt 74.8 kg (165 lb)   SpO2 97%   BMI 29.23 kg/m²   Constitutional: in acute distress, Non-toxic appearance.   HENT: Normocephalic, Atraumatic, tympanic membranes are intact and nonerythematous bilaterally, Oropharynx moist without exudates or erythema, Nose normal.   Eyes: PERRLA, EOMI, Conjunctiva normal.  Neck: Supple without meningismus  Lymphatic: No lymphadenopathy noted.   Cardiovascular: Normal heart rate, Normal rhythm, No murmurs, No rubs, No gallops.   Thorax & Lungs: Normal breath sounds, No respiratory distress, No wheezing, No chest tenderness.   Abdomen: Bowel sounds normal, Soft, No tenderness, no rebound, no guarding, no distention, No masses, No pulsatile masses.   Skin: Warm, Dry, No erythema, No rash.   Back: No tenderness, No CVA tenderness.   Extremities: Atraumatic with symmetric distal pulses, No edema, No tenderness, No cyanosis, No clubbing.   Neurologic: Alert & oriented x 3, cranial nerves II through XII are intact, 3 out of 5 strength left upper and lower extremity, sensation is intact, the patient does have profound ataxia   Psychiatric: Affect normal, Judgment normal, Mood normal.     COURSE & MEDICAL DECISION MAKING  Pertinent Labs & Imaging studies reviewed. (See chart for details)  This is 72-year-old gentleman who presents emergency department left-sided weakness. I did review his recent MRI that showed multiple hemorrhagic masses involving the right cerebellar and cerebral hemisphere as well as a superior left frontal lobe. I also reviewed a CT scan from 12 December that showed similar lesions. The patient also had a CT scan of the cervical spine that showed a suspicious lesion in the right apex consistent with a neoplasm. Therefore suspect the patient does have metastatic disease which is very concerning. Furthermore he has loss of function of his left upper extremity and lower extremity " which is not complete. Therefore neurosurgery has been consulted for emergent consultation and the patient admitted to the hospitalist. Baseline laboratory studies have been ordered and did not show any significant abnormalities. The [facility] has accepted the patient will be admitted in guarded condition.     I spoke with the neurosurgeon he does recommend Decadron 4 milligrams every 6 hours and this information will be relayed to the admitting team. At the time of admission the patient's exam remains unchanged and he is hemodynamically stable.    FINAL IMPRESSION  1. Left-sided weakness secondary to suspected metastatic intracranial disease   2. Critical care time 30 minutes     Disposition  The patient will be admitted in guarded condition    Electronically signed by: Andreas Meng, 12/15/2017 9:09 PM

## 2017-12-16 NOTE — CONSULTS
Procedure Requested: liver or lung biopsy  Date of request: 12/16/17  Date of [id number]/16/17  Requesting Provider: MD Lucius    Summary:  RUL nodule, RIGHT hepatic mass on outside CT         Interventional Radiology Recommendation:  Obtain outside imaging report and review  Schedule RIGHT lung biopsy as outpatient  D/W Dr. Kan

## 2017-12-16 NOTE — CARE PLAN
Problem: Discharge Barriers/Planning  Goal: Patient's continuum of care needs will be met    Intervention: Assess potential discharge barriers on admission and throughout hospital stay  Pt would like to see oncologist and have Biopsy done outpatient at [facility]. Correspondence with Dr. Kan to facilitate.

## 2017-12-16 NOTE — DISCHARGE INSTRUCTIONS
Discharge Instructions    Discharged to home by car with relative. Discharged via walking, hospital escort: Refused.  Special equipment needed: Not Applicable    Be sure to schedule a follow-up appointment with your primary care doctor or any specialists as instructed.     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 Unless I have been given specific instructions below for another diet, I accept this instruction as my diet prescription.   Other diet: Regular diet    Special Instructions: None    · Is patient discharged on Warfarin / Coumadin?   No     · Is patient Post Blood Transfusion?  No    Depression / Suicide Risk    As you are discharged from this [facility] facility, it is important to learn how to keep safe from harming yourself.    Recognize the warning signs:  · Abrupt changes in personality, positive or negative- including increase in energy   · Giving away possessions  · Change in eating patterns- significant weight changes-  positive or negative  · Change in sleeping patterns- unable to sleep or sleeping all the time   · Unwillingness or inability to communicate  · Depression  · Unusual sadness, discouragement and loneliness  · Talk of wanting to die  · Neglect of personal appearance   · Rebelliousness- reckless behavior  · Withdrawal from people/activities they love  · Confusion- inability to concentrate     If you or a loved one observes any of these behaviors or has concerns about self-harm, here's what you can do:  · Talk about it- your feelings and reasons for harming yourself  · Remove any means that you might use to hurt  yourself (examples: pills, rope, extension cords, firearm)  · Get professional help from the community (Mental Health, Substance Abuse, psychological counseling)  · Do not be alone:Call your Safe Contact- someone whom you trust who will be there for you.  · Call your local CRISIS HOTLINE 706-6763 or 544-666-9867  · Call your local Children's Mobile Crisis Response Team [place] (964) 699-8929 or www.[employer]  · Call the toll free National Suicide Prevention Hotlines   · National Suicide Prevention Lifeline 991-466-IWVE (4468)  · [employer] Hope Line Network 800-SUICIDE (392-7999)    Dexamethasone tablets  What is this medicine?  DEXAMETHASONE (dex a METH a sone) is a corticosteroid. It is commonly used to treat inflammation of the skin, joints, lungs, and other organs. Common conditions treated include asthma, allergies, and arthritis. It is also used for other conditions, such as blood disorders and diseases of the adrenal glands.  This medicine may be used for other purposes; ask your health care provider or pharmacist if you have questions.  COMMON BRAND NAME(S): Decadron, DexPak Jr TaperPak, DexPak TaperPak, Zema-Jose  What should I tell my health care provider before I take this medicine?  They need to know if you have any of these conditions:  -Cushing's syndrome  -diabetes  -glaucoma  -heart problems or disease  -high blood pressure  -infection like herpes, measles, tuberculosis, or chickenpox  -kidney disease  -liver disease  -mental problems  -myasthenia gravis  -osteoporosis  -previous heart attack  -seizures  -stomach, ulcer or intestine disease including colitis and diverticulitis  -thyroid problem  -an unusual or allergic reaction to dexamethasone, corticosteroids, other medicines, lactose, foods, dyes, or preservatives  -pregnant or trying to get pregnant  -breast-feeding  How should I use this medicine?  Take this medicine by mouth with a drink of water. Follow the directions on the  prescription label. Take it with food or milk to avoid stomach upset. If you are taking this medicine once a day, take it in the morning. Do not take more medicine than you are told to take. Do not suddenly stop taking your medicine because you may develop a severe reaction. Your doctor will tell you how much medicine to take. If your doctor wants you to stop the medicine, the dose may be slowly lowered over time to avoid any side effects.  Talk to your pediatrician regarding the use of this medicine in children. Special care may be needed.  Patients over 65 years old may have a stronger reaction and need a smaller dose.  Overdosage: If you think you have taken too much of this medicine contact a poison control center or emergency room at once.  NOTE: This medicine is only for you. Do not share this medicine with others.  What if I miss a dose?  If you miss a dose, take it as soon as you can. If it is almost time for your next dose, talk to your doctor or health care professional. You may need to miss a dose or take an extra dose. Do not take double or extra doses without advice.  What may interact with this medicine?  Do not take this medicine with any of the following medications:  -mifepristone, RU-486  -vaccines  This medicine may also interact with the following medications:  -amphotericin B  -antibiotics like clarithromycin, erythromycin, and troleandomycin  -aspirin and aspirin-like drugs  -barbiturates like phenobarbital  -carbamazepine  -cholestyramine  -cholinesterase inhibitors like donepezil, galantamine, rivastigmine, and tacrine  -cyclosporine  -digoxin  -diuretics  -ephedrine  -female hormones, like estrogens or progestins and birth control pills  -indinavir  -isoniazid  -ketoconazole  -medicines for diabetes  -medicines that improve muscle tone or strength for conditions like myasthenia gravis  -NSAIDs, medicines for pain and inflammation, like ibuprofen or  naproxen  -phenytoin  -rifampin  -thalidomide  -warfarin  This list may not describe all possible interactions. Give your health care provider a list of all the medicines, herbs, non-prescription drugs, or dietary supplements you use. Also tell them if you smoke, drink alcohol, or use illegal drugs. Some items may interact with your medicine.  What should I watch for while using this medicine?  Visit your doctor or health care professional for regular checks on your progress. If you are taking this medicine over a prolonged period, carry an identification card with your name and address, the type and dose of your medicine, and your doctor's name and address.  This medicine may increase your risk of getting an infection. Stay away from people who are sick. Tell your doctor or health care professional if you are around anyone with measles or chickenpox.  If you are going to have surgery, tell your doctor or health care professional that you have taken this medicine within the last twelve months.  Ask your doctor or health care professional about your diet. You may need to lower the amount of salt you eat.  The medicine can increase your blood sugar. If you are a diabetic check with your doctor if you need help adjusting the dose of your diabetic medicine.  What side effects may I notice from receiving this medicine?  Side effects that you should report to your doctor or health care professional as soon as possible:  -allergic reactions like skin rash, itching or hives, swelling of the face, lips, or tongue  -changes in vision  -fever, sore throat, sneezing, cough, or other signs of infection, wounds that will not heal  -increased thirst  -mental depression, mood swings, mistaken feelings of self importance or of being mistreated  -pain in hips, back, ribs, arms, shoulders, or legs  -redness, blistering, peeling or loosening of the skin, including inside the mouth  -trouble passing urine or change in the amount of  urine  -swelling of feet or lower legs  -unusual bleeding or bruising  Side effects that usually do not require medical attention (report to your doctor or health care professional if they continue or are bothersome):  -headache  -nausea, vomiting  -skin problems, acne, thin and shiny skin  -weight gain  This list may not describe all possible side effects. Call your doctor for medical advice about side effects. You may report side effects to FDA at 9-075-FDA-2654.  Where should I keep my medicine?  Keep out of the reach of children.  Store at room temperature between 20 and 25 degrees C (68 and 77 degrees F). Protect from light. Throw away any unused medicine after the expiration date.  NOTE: This sheet is a summary. It may not cover all possible information. If you have questions about this medicine, talk to your doctor, pharmacist, or health care provider.  © 2014, [employer]. (4/9/2009 2:02:13 PM)

## 2017-12-16 NOTE — CARE PLAN
Problem: Safety  Goal: Will remain free from injury    Intervention: Collaborate with Interdisciplinary Team for safe transfer and mobilization techniques  Pt is steady on his own, aware of limitations, ambulates bishop without assist.      Problem: Safety:  Goal: Will remain free from injury    Intervention: Collaborate with Interdisciplinary Team for safe transfer and mobilization techniques  Pt is steady on his own, aware of limitations, ambulates bishop without assist.

## 2017-12-16 NOTE — ED NOTES
ERP evaluated patient prior to arrival to room. PIV established; pt connected to monitor; admitting MD at the bedside. VSS at this time.

## 2017-12-16 NOTE — PROGRESS NOTES
Dc instructions reviewed with pt and spouse. All questions addressed and answered. Script for decadron given to pt. PIV removed. Pt dressing self.

## 2017-12-16 NOTE — H&P
Hospital Medicine History and Physical    Date of Service  12/15/2017    Chief Complaint  Chief Complaint   Patient presents with   • Unilateral Weakness     To left upper and lower extremities since Sunday, hx of brain tumors with mets       History of Presenting Illness  72 y.o. male who presented 12/15/2017 with Difficulty of utilizing his left upper extremity and leg. A seven-day nose he was off balance and progressive weakness of his left upper and lower extremity. Weakness getting worse especially in the left upper extremity. Nothing makes it better nothing makes it worse. Tuesday patient went to the clinic to [facility] and CT scan performed showed lesions concerning for metastatic cancer of the brain. He also had CT chest CT abdomen which showed lesion in the liver and right upper lobe as well. However we do not have these records. He had MRI today showed a cerebellar and cerebral suspected metastatic lesion with edema as well as mild acute hemorrhage. Patient was instructed to come better now for further workup. No visual changes no headaches no speech changes.   Primary Care Physician  SONYA Cormier    Consultants  NSG    Code Status  Full    Review of Systems  Review of Systems   Constitutional: Positive for malaise/fatigue. Negative for chills and fever.   HENT: Negative for ear discharge, ear pain and hearing loss.    Eyes: Negative for blurred vision, double vision and photophobia.   Respiratory: Negative for cough, shortness of breath and wheezing.    Cardiovascular: Negative for chest pain, palpitations and leg swelling.   Gastrointestinal: Negative for abdominal pain, constipation, diarrhea, heartburn, nausea and vomiting.   Genitourinary: Negative for dysuria and flank pain.   Musculoskeletal: Negative for back pain, myalgias and neck pain.   Skin: Negative for rash.   Neurological: Positive for sensory change, speech change, focal weakness and weakness. Negative for tingling, tremors,  "seizures, loss of consciousness and headaches.   Endo/Heme/Allergies: Does not bruise/bleed easily.   Psychiatric/Behavioral: Negative for depression and substance abuse.        Past Medical History  Past Medical History:   Diagnosis Date   • Cancer (CMS-HCC)     brain, lung, liver   • Hypercholesterolemia    • Hypertension        Surgical History  Past Surgical History:   Procedure Laterality Date   • OTHER ORTHOPEDIC SURGERY      \"back surgery\"       Medications  No current facility-administered medications on file prior to encounter.      Current Outpatient Prescriptions on File Prior to Encounter   Medication Sig Dispense Refill   • lisinopril (PRINIVIL) 2.5 MG Tab Take 2.5 mg by mouth 2 times a day.     • atorvastatin (LIPITOR) 40 MG Tab Take 40 mg by mouth every evening.         Family History  History reviewed. No pertinent family history.    Social History  Social History   Substance Use Topics   • Smoking status: Former Smoker     Packs/day: 1.00     Types: Cigarettes     Quit date: 1990   • Smokeless tobacco: Never Used   • Alcohol use Yes      Comment: 2 glasses wine/night       Allergies  No Known Allergies     Physical Exam  Laboratory   Hemodynamics  Temp (24hrs), Av.9 °C (98.4 °F), Min:36.9 °C (98.4 °F), Max:36.9 °C (98.4 °F)   Temperature: 36.9 °C (98.4 °F)  Pulse  Av  Min: 58  Max: 64    Blood Pressure : 155/81      Respiratory      Respiration: 16, Pulse Oximetry: 97 %             Physical Exam   Constitutional: He is oriented to person, place, and time. He appears well-developed and well-nourished. No distress.   HENT:   Head: Normocephalic and atraumatic.   Eyes: Conjunctivae and EOM are normal. Pupils are equal, round, and reactive to light.   Neck: Normal range of motion. Neck supple. No JVD present.   Cardiovascular: Normal rate, regular rhythm, normal heart sounds and intact distal pulses.    No murmur heard.  Pulmonary/Chest: Effort normal and breath sounds normal. No " respiratory distress. He has no wheezes.   Abdominal: Soft. Bowel sounds are normal. He exhibits no distension. There is no tenderness.   Musculoskeletal: Normal range of motion. He exhibits no edema.   Neurological: He is alert and oriented to person, place, and time.   Absent left sided shoulder shrug, weakness in strength left arm and leg. Otherwise cranial nerves intact   Skin: Skin is warm and dry. No erythema.   Psychiatric: He has a normal mood and affect. His behavior is normal. Judgment and thought content normal.       Recent Labs      12/15/17   2018   WBC  5.9   RBC  4.98   HEMOGLOBIN  14.6   HEMATOCRIT  43.4   MCV  87.1   MCH  29.3   MCHC  33.6*   RDW  41.1   PLATELETCT  227   MPV  9.4     Recent Labs      12/15/17   2018   SODIUM  137   POTASSIUM  4.1   CHLORIDE  104   CO2  25   GLUCOSE  114*   BUN  18   CREATININE  0.84   CALCIUM  9.6     Recent Labs      12/15/17   2018   ALTSGPT  20   ASTSGOT  22   ALKPHOSPHAT  76   TBILIRUBIN  0.4   GLUCOSE  114*     Recent Labs      12/15/17   2018   APTT  27.1   INR  0.96             Lab Results   Component Value Date    TROPONINI <0.01 12/15/2017     Urinalysis:  No results found for: SPECGRAVITY, GLUCOSEUR, KETONES, NITRITE, WBCURINE, RBCURINE, BACTERIA, EPITHELCELL     Imaging  Please see scanned images    Assessment/Plan     I anticipate this patient will require at least two midnights for appropriate medical management, necessitating inpatient admission.    * Cancer of brain (CMS-HCC)   Assessment & Plan    Noted on MRI  Started on Decadron  Neurosurgery consult.  Will need heme/onc consultation in the morning        Cancer of liver (CMS-HCC)   Assessment & Plan    Lesion noted on liver outside hospital records attempt to obtain records        Cancer of lung (CMS-HCC)   Assessment & Plan    Per report from patient right upper lobe lesion noted at outside hospital  We'll attempt to obtain records        Dyslipidemia   Assessment & Plan    Continue statin when  able to tolerate        Hemorrhagic stroke (CMS-[facility])   Assessment & Plan    Left-sided weakness with concern for hemorrhagic stoke on MRI  We'll need blood sugar control  Blood pressure control IV PRN  Neurosurgery consult. We'll see the patient in the morning  PT OT speech eval is pending        Essential hypertension, benign- (present on admission)   Assessment & Plan    IV when necessary for now and resume oral medications when able to tolerate            VTE prophylaxis: SCD

## 2017-12-16 NOTE — ASSESSMENT & PLAN NOTE
Noted on MRI  Started on Decadron  Neurosurgery consult.  Will need heme/onc consultation in the morning

## 2017-12-16 NOTE — ED NOTES
Blood drawn and sent to lab. Pt declined wheelchair, ambulatory to triage room with slow steady gait and assist of hand rail. Updated on wait times

## 2017-12-16 NOTE — ASSESSMENT & PLAN NOTE
Per report from patient right upper lobe lesion noted at outside hospital  We'll attempt to obtain records

## 2017-12-16 NOTE — DISCHARGE SUMMARY
Hospital Medicine Discharge Note     Admit Date:  12/15/2017       Discharge Date:   12/16/2017    Attending Physician:  Tom Kan     Diagnoses (includes active and resolved):   Principal Problem:    Cancer of brain (CMS-HCC) POA: Unknown  Active Problems:    Essential hypertension, benign POA: Yes    Hemorrhagic stroke (CMS-HCC) POA: Unknown    Dyslipidemia POA: Unknown    Cancer of lung (CMS-HCC) POA: Unknown    Cancer of liver (CMS-HCC) POA: Unknown  Resolved Problems:    * No resolved hospital problems. *      Hospital Summary (Brief Narrative):       72 y.o. male who presented 12/15/2017 with Difficulty of utilizing his left upper extremity and leg. Tuesday patient went to [facility] and CT scan showed lesions concerning for metastatic cancer of the brain. He also had CT chest CT abdomen which showed lesion in the liver and right upper lobe as well. The liver lesion appears to be more of a hemangioma rather than a mass. He had MRI at [facility] that showed a cerebellar and cerebral suspected metastatic lesion with edema as well as mild acute hemorrhage.   Thus the patient came to her now for further workup. The case was discussed with both neurosurgery as well as interventional radiology. Interventional radiology does not do biopsies on the weekend and thus the patient would need to wait until Monday to get a biopsy. The case was also discussed with neurosurgery who agreed with steroids. Patient preferred to go get the biopsy done at [facility] possible. Both neurology and interventional radiology agreed that this plan would also work. Thus, the patient was provided with the number for [facility], I have also left him a message. They will call on Monday and try to get acceptance at [facility] outpatient for likely lung biopsy.     Consultants:      SHAQUILLE Godinez    Procedures:        None    Discharge Medications:           Medication List      START taking these medications      Instructions  "  dexamethasone 4 MG Tabs  Commonly known as:  DECADRON   Take 1 Tab by mouth 3 times a day.  Dose:  4 mg        CONTINUE taking these medications      Instructions   atorvastatin 40 MG Tabs  Commonly known as:  LIPITOR   Take 40 mg by mouth every evening.  Dose:  40 mg     lisinopril 2.5 MG Tabs  Commonly known as:  PRINIVIL   Take 2.5 mg by mouth 2 times a day.  Dose:  2.5 mg     vitamin D 1000 UNIT Tabs  Commonly known as:  cholecalciferol   Take 1,000 Units by mouth every day.  Dose:  1000 Units          Disposition:   Discharge home    Diet:   Regular    Activity:   As tolerated    Code status:   Full code    Primary Care Provider:    SONYA Cormire    Follow up appointment details :      [facility]  [phone] (option #2 or 3)        No future appointments.    Pending Studies:        Biopsy at [facility]    Time spent on discharge day patient visit: 41 minutes    #################################################    Interval history/exam for day of discharge:    Vitals:    12/15/17 2200 12/16/17 0000 12/16/17 0400 12/16/17 0748   BP: 144/74 127/71 139/57 145/77   Pulse:  84 89 94   Resp:  16 16 18   Temp: 36.4 °C (97.6 °F) 36.5 °C (97.7 °F) 36.5 °C (97.7 °F) 36.4 °C (97.6 °F)   SpO2: 94% 95% 92% 94%   Weight: 73.7 kg (162 lb 7.7 oz)      Height: 1.6 m (5' 3\")        Weight/BMI: Body mass index is 28.78 kg/m².  Pulse Oximetry: 94 %, O2 (LPM): 0, O2 Delivery: None (Room Air)    General: frail, NAD  CVS:  RRR  PULM:  CTAB, no respiratory distress  Neuro:     Most Recent Labs:    Lab Results   Component Value Date/Time    WBC 6.0 12/16/2017 03:53 AM    RBC 4.73 12/16/2017 03:53 AM    HEMOGLOBIN 14.1 12/16/2017 03:53 AM    HEMATOCRIT 41.5 (L) 12/16/2017 03:53 AM    MCV 87.7 12/16/2017 03:53 AM    MCH 29.8 12/16/2017 03:53 AM    MCHC 34.0 12/16/2017 03:53 AM    MPV 9.7 12/16/2017 03:53 AM    NEUTSPOLYS 90.70 (H) 12/16/2017 03:53 AM    LYMPHOCYTES 7.60 (L) 12/16/2017 03:53 AM    MONOCYTES " 1.20 12/16/2017 03:53 AM    EOSINOPHILS 0.00 12/16/2017 03:53 AM    BASOPHILS 0.20 12/16/2017 03:53 AM      Lab Results   Component Value Date/Time    SODIUM 136 12/16/2017 03:53 AM    POTASSIUM 4.3 12/16/2017 03:53 AM    CHLORIDE 104 12/16/2017 03:53 AM    CO2 26 12/16/2017 03:53 AM    GLUCOSE 216 (H) 12/16/2017 03:53 AM    BUN 18 12/16/2017 03:53 AM    CREATININE 0.78 12/16/2017 03:53 AM      Lab Results   Component Value Date/Time    ALTSGPT 18 12/16/2017 03:53 AM    ASTSGOT 19 12/16/2017 03:53 AM    ALKPHOSPHAT 68 12/16/2017 03:53 AM    TBILIRUBIN 0.4 12/16/2017 03:53 AM    ALBUMIN 3.7 12/16/2017 03:53 AM    GLOBULIN 2.9 12/16/2017 03:53 AM    INR 0.96 12/15/2017 08:18 PM     Lab Results   Component Value Date/Time    PROTHROMBTM 12.5 12/15/2017 08:18 PM    INR 0.96 12/15/2017 08:18 PM        Imaging/ Testing:      OUTSIDE IMAGES-CT CHEST/ABDOMEN/PELVIS   Final Result      OUTSIDE IMAGES-CT CERVICAL SPINE   Final Result      OUTSIDE IMAGES-CT HEAD   Final Result      OUTSIDE IMAGES-MR BRAIN   Final Result          Instructions:      The patient was instructed to return to the ER in the event of worsening symptoms. I have counseled the patient on the importance of compliance and the patient has agreed to proceed with all medical recommendations and follow up plan indicated above.   The patient understands that all medications come with benefits and risks. Risks may include permanent injury or death and these risks can be minimized with close reassessment and monitoring.

## 2017-12-16 NOTE — CONSULTS
DATE OF SERVICE:  12/16/2017    PRIMARY CARE PHYSICIAN:  MAYURI To    CHIEF COMPLAINT:  Left-sided hemiparesis.    HISTORY OF PRESENT ILLNESS:  A 72-year-old gentleman who had difficulty with   his left arm and leg for the last 7 days.  He has been off balance, bumping   into things, and getting worse and worse.  Tuesday, he went to the clinic.  He   had a head CT showing lesions suspicious for metastatic cancer.  He had an   MRI examination yesterday, which revealed cerebellar and cerebral metastasis   and he was sent to [facility].    Further workup shows he has lesions in the brain, liver, and lung.    PAST MEDICAL HISTORY:  Remarkable for hypertension and hypercholesterolemia.    CURRENT MEDICATIONS:  Include lisinopril and Lipitor.    ALLERGIES:  None known.    PHYSICAL EXAMINATION:  GENERAL:  He is a well-developed, well-nourished gentleman, obviously has left   hemiparesis.  HEENT:  Normocephalic and atraumatic.  Pupils are equal, round, and reactive   to light.  NECK:  Normal range of motion.  CARDIOVASCULAR:  Normal rate and rhythm.  No murmurs or gallops.  PULMONARY:  Chest is clear to auscultation.  ABDOMEN:  Normal bowel sounds.  NEUROLOGIC:  He is alert and oriented x4.  Gives a great history.  He has   weakness in his left upper extremity graded at 2-3/5, although his intrinsics   and  are probably higher at 4/5.  His lower extremity, he is 4-/5   proximally and distally he is 3/5.    On his right side, he has full strength.    Tone is diminished on the left.    RADIOGRAPHIC REVIEW:  His MRI of the brain was reviewed.  This shows multiple   areas consistent with metastasis, starting with a lesion in the right   cerebellar hemisphere.  He also has one just above the ear in the temporal   lobe on the right hand side with hemosiderin deposit around it.  He has a   third one just above the superior temporal gyrus and then the fourth seen in   the motor cortex or  possibly premotor cortex on the right.  There appears to   have some component of hemorrhage.    He has no impending mass effect or midline shift.    It is also noted that he has possible lesion in the lung and may be hemangioma   in the liver.    IMPRESSION:  This is a gentleman who needs to be worked up obviously with   metastatic disease.  He has 2 cranial lesions, which are easily accessible and   superficial, which could be removed.  This is part of his treatment, could   lead to better outcome, but I think that the pathology needs to be understood   little bit better.  If his lung lesion is easily biopsiable, I probably would   go after this first.    He wants to go to [facility] for further evaluation and I would be happy to   continue to follow him.       ____________________________________     MD STEPHANIE MCRAE / CORTEZ    DD:  12/16/2017 13:07:30  DT:  12/16/2017 13:45:36    D#:  [id number]  Job#:  [id number]